# Patient Record
Sex: FEMALE | Race: WHITE | NOT HISPANIC OR LATINO | Employment: FULL TIME | URBAN - METROPOLITAN AREA
[De-identification: names, ages, dates, MRNs, and addresses within clinical notes are randomized per-mention and may not be internally consistent; named-entity substitution may affect disease eponyms.]

---

## 2020-07-22 ENCOUNTER — OFFICE VISIT (OUTPATIENT)
Dept: MIDWIFE SERVICES | Facility: CLINIC | Age: 30
End: 2020-07-22
Payer: COMMERCIAL

## 2020-07-22 VITALS
HEIGHT: 65 IN | BODY MASS INDEX: 30.49 KG/M2 | SYSTOLIC BLOOD PRESSURE: 102 MMHG | DIASTOLIC BLOOD PRESSURE: 72 MMHG | HEART RATE: 72 BPM | WEIGHT: 183 LBS

## 2020-07-22 DIAGNOSIS — Z11.8 SCREENING FOR CHLAMYDIAL DISEASE: Primary | ICD-10-CM

## 2020-07-22 DIAGNOSIS — Z30.09 ENCOUNTER FOR COUNSELING REGARDING CONTRACEPTION: ICD-10-CM

## 2020-07-22 DIAGNOSIS — Z11.3 SCREEN FOR STD (SEXUALLY TRANSMITTED DISEASE): ICD-10-CM

## 2020-07-22 PROCEDURE — 87591 N.GONORRHOEAE DNA AMP PROB: CPT | Performed by: NURSE PRACTITIONER

## 2020-07-22 PROCEDURE — 99203 OFFICE O/P NEW LOW 30 MIN: CPT | Performed by: NURSE PRACTITIONER

## 2020-07-22 PROCEDURE — 87491 CHLMYD TRACH DNA AMP PROBE: CPT | Performed by: NURSE PRACTITIONER

## 2020-07-22 SDOH — HEALTH STABILITY: MENTAL HEALTH: HOW MANY STANDARD DRINKS CONTAINING ALCOHOL DO YOU HAVE ON A TYPICAL DAY?: 3 OR 4

## 2020-07-22 SDOH — HEALTH STABILITY: MENTAL HEALTH: HOW OFTEN DO YOU HAVE 6 OR MORE DRINKS ON ONE OCCASION?: LESS THAN MONTHLY

## 2020-07-22 SDOH — HEALTH STABILITY: MENTAL HEALTH: HOW OFTEN DO YOU HAVE A DRINK CONTAINING ALCOHOL?: 2-4 TIMES A MONTH

## 2020-07-22 ASSESSMENT — ANXIETY QUESTIONNAIRES
6. BECOMING EASILY ANNOYED OR IRRITABLE: NOT AT ALL
IF YOU CHECKED OFF ANY PROBLEMS ON THIS QUESTIONNAIRE, HOW DIFFICULT HAVE THESE PROBLEMS MADE IT FOR YOU TO DO YOUR WORK, TAKE CARE OF THINGS AT HOME, OR GET ALONG WITH OTHER PEOPLE: NOT DIFFICULT AT ALL
2. NOT BEING ABLE TO STOP OR CONTROL WORRYING: NOT AT ALL
7. FEELING AFRAID AS IF SOMETHING AWFUL MIGHT HAPPEN: NOT AT ALL
3. WORRYING TOO MUCH ABOUT DIFFERENT THINGS: NOT AT ALL
5. BEING SO RESTLESS THAT IT IS HARD TO SIT STILL: NOT AT ALL
GAD7 TOTAL SCORE: 0
1. FEELING NERVOUS, ANXIOUS, OR ON EDGE: NOT AT ALL

## 2020-07-22 ASSESSMENT — PATIENT HEALTH QUESTIONNAIRE - PHQ9
SUM OF ALL RESPONSES TO PHQ QUESTIONS 1-9: 0
5. POOR APPETITE OR OVEREATING: NOT AT ALL

## 2020-07-22 ASSESSMENT — MIFFLIN-ST. JEOR: SCORE: 1560.96

## 2020-07-22 NOTE — PROGRESS NOTES
SUBJECTIVE:   Olive Salinas is a 30 year old who presents to the clinic for discussion of birth control methods.   She has used the following methods in the past: CHANDANA and withdrawal with calendar method.  She is in a new monogamous relationship and desires to discuss effective options that she does not need to remember to take every day.   Today she is interested in discussing Mirena IUD, Paragard IUD, Depo Provera, Nexplanon and Nuva Ring  Histories reviewed and updated  Past Medical History:   Diagnosis Date     Depression     history of depression 6 yrs ago     Past Surgical History:   Procedure Laterality Date     C RAD RESEC TONSIL/PILLARS  2015     KIDNEY SURGERY  1994    kidney reflux      Social History     Socioeconomic History     Marital status: Single     Spouse name: Not on file     Number of children: Not on file     Years of education: Not on file     Highest education level: Not on file   Occupational History     Not on file   Social Needs     Financial resource strain: Not on file     Food insecurity     Worry: Not on file     Inability: Not on file     Transportation needs     Medical: Not on file     Non-medical: Not on file   Tobacco Use     Smoking status: Never Smoker     Smokeless tobacco: Never Used   Substance and Sexual Activity     Alcohol use: Yes     Frequency: 2-4 times a month     Drinks per session: 3 or 4     Binge frequency: Less than monthly     Comment: social drinker     Drug use: Never     Sexual activity: Yes     Partners: Male     Birth control/protection: None   Lifestyle     Physical activity     Days per week: Not on file     Minutes per session: Not on file     Stress: Not on file   Relationships     Social connections     Talks on phone: Not on file     Gets together: Not on file     Attends Church service: Not on file     Active member of club or organization: Not on file     Attends meetings of clubs or organizations: Not on file     Relationship status: Not  "on file     Intimate partner violence     Fear of current or ex partner: Not on file     Emotionally abused: Not on file     Physically abused: Not on file     Forced sexual activity: Not on file   Other Topics Concern     Not on file   Social History Narrative     Not on file     Family History   Problem Relation Age of Onset     Migraines Mother      Hypothyroidism Mother      Depression Father      No Known Problems Sister      GI problems Brother      No Known Problems Maternal Grandmother      No Known Problems Maternal Grandfather      No Known Problems Paternal Grandmother      Breast Cancer No family hx of      Ovarian Cancer No family hx of      Colon Cancer No family hx of      Uterine Cancer No family hx of        Menstrual History:  Menses every 28 days.  Length of menses: 4 days  Menstrual description: crampy    Denies the following contraindications to estrogen/progesterone combined contraception:  Migraine with aura  Smoking over age 37  Liver disease  Personal history of blood clot or stroke   History of heart disease  History of breast cancer  Undiagnosed vaginal bleeding  Hypertension  Pregnancy    Denies the following contraindications to the IUD:  Distortion of the uterine cavity  Harshal's disease/copper allergy  Active pelvic infection  Unexplained uterine bleeding  Known or suspected pregnancy  Breast cancer or liver disease    Health maintenance updated:  yes    ROS:   12 point review of systems negative other than symptoms noted below or in the HPI.  Genitourinary: recent intercourse    EXAM:  /72   Pulse 72   Ht 1.651 m (5' 5\")   Wt 83 kg (183 lb)   LMP 07/10/2020 (Exact Date)   Breastfeeding No   BMI 30.45 kg/m    Body mass index is 30.45 kg/m .    ASSESSMENT/PLAN:    ICD-10-CM    1. Screening for chlamydial disease  Z11.8 CHLAMYDIA TRACHOMATIS PCR   2. Screen for STD (sexually transmitted disease)  Z11.3 NEISSERIA GONORRHOEA PCR   3. Encounter for counseling regarding " contraception  Z30.09      There are no contraindications to the use of Mirena IUD, Depo Provera, Nexplanon and Nuva Ring    COUNSELING:  Reviewed risks and benefits of contraceptive use  Olive desires to have Mirena IUD placed.    Last intercourse 7/17-7/18  Discussed proper use of chosen method, timing of placement 2 weeks from intercourse with negative pregnancy test or with next menstrual cycle.  STI screening done today  Handouts/Instrucions provided    Josie SHARPE CNM

## 2020-07-22 NOTE — PATIENT INSTRUCTIONS
Patient Education     Birth Control Methods  Birth control methods are used to help prevent pregnancy. There are many different methods to choose from. Talk to your healthcare provider about which method is right for you. Be sure to ask your provider about the effectiveness of each method. Also ask about the benefits, risks, and side effects of each method.  Hormones  Some birth control methods work by releasing hormones such as progestin and estrogen. These methods include hormone implants, hormone shots, the vaginal ring, the patch, and birth control pills. They all work by stopping ovulation (release of the egg from the ovary). The implant is a small device that needs to be placed in the upper arm by a trained healthcare provider. It works for up to 3 years. Hormone injections must be repeated every 3 months. The vaginal ring must be replaced monthly (it can be removed during the fourth week of each cycle). The patch must be replaced weekly (it is not worn during the fourth week of each cycle). Birth control pills must be taken every day. All of these methods are effective and can be stopped at any time.  Intrauterine device (IUD)  An IUD is a small, T-shaped device. It must be placed in the uterus by a trained healthcare provider. There are different types of IUDs available. They work by causing changes in the uterus that make it harder for sperm to reach the egg. Depending on the type of IUD you have, it may work for several years or longer. The IUD is a reversible birth control method. This means it can be removed at any time.  Condom  A condom is a sheath that forms a thin barrier between the penis and the vagina. It helps prevent pregnancy by keeping sperm from entering the vagina. When latex condoms are used, they have the added benefit of protecting against most STIs (sexually transmitted infections). Condoms are used each time there is sexual intercourse and should be discarded after each use. Ask your  healthcare provider about the different types of condoms available. These include both the male condom and female condom.  Spermicide  Spermicides come as foams, jellies, creams, suppositories, and tablets.  They help prevent pregnancy by killing sperm. When used alone they are not that reliable. They work best when combined with other birth control methods such as diaphragms and cervical caps.  Sponge, diaphragm, and cervical cap  All of these methods help prevent pregnancy by covering the opening of the uterus (cervix). This prevents sperm from passing through.  The sponge contains spermicide. It can be bought over the counter. The sponge must be left in place for at least 6 hours after the last time you have sex. However, it should not stay in place for more than 24 hours. It should be discarded after it is used.  The diaphragm and cervical cap must be fitted and prescribed by your healthcare provider. Both are used with spermicide. The diaphragm must be left in place for at least 6 hours after sex. However, it should not stay in place for more than 24 hours. It can be washed and reused. The cervical cap must be left in place for at least 6 hours after sex. However, it should not stay in place for more than 48 hours. It can be washed and reused.  Withdrawal method  This is when the man pulls his penis out of the vagina just before ejaculation ( coming ). This lowers the amount of sperm entering the vagina. Be aware that fluids released just before ejaculation often still contain some sperm, so this method is not as reliable as certain other methods.  Rhythm method  This method requires that you know when in your menstrual cycle you are likely to become pregnant. Then, you avoid sex during those days. This requires careful planning and good discipline. Your healthcare provider can explain more about how this works.  Tubal ligation and vasectomy  These are surgical methods to prevent pregnancy. Tubal ligation is an  option for women. The fallopian tubes are blocked or cut (ligated). This keeps the egg from passing into the uterus or sperm from reaching the egg. Vasectomy is an option for men. The tubes that normally carry sperm to the penis are either closed or blocked. Both tubal ligation and vasectomy are permanent birth control methods. This means reversal is either not possible or unlikely to work. They are good choices for women and men who know that they do not want to have children in the future.  Date Last Reviewed: 11/1/2017 2000-2019 [employer]. [address] [place] [address]. All rights reserved. This information is not intended as a substitute for professional medical care. Always follow your healthcare professional's instructions.           Patient Education     Birth Control Methods  Birth control methods are used to help prevent pregnancy. There are many different methods to choose from. Talk to your healthcare provider about which method is right for you. Be sure to ask your provider about the effectiveness of each method. Also ask about the benefits, risks, and side effects of each method.  Hormones  Some birth control methods work by releasing hormones such as progestin and estrogen. These methods include hormone implants, hormone shots, the vaginal ring, the patch, and birth control pills. They all work by stopping ovulation (release of the egg from the ovary). The implant is a small device that needs to be placed in the upper arm by a trained healthcare provider. It works for up to 3 years. Hormone injections must be repeated every 3 months. The vaginal ring must be replaced monthly (it can be removed during the fourth week of each cycle). The patch must be replaced weekly (it is not worn during the fourth week of each cycle). Birth control pills must be taken every day. All of these methods are effective and can be stopped at any time.  Intrauterine device (IUD)  An IUD is a  small, T-shaped device. It must be placed in the uterus by a trained healthcare provider. There are different types of IUDs available. They work by causing changes in the uterus that make it harder for sperm to reach the egg. Depending on the type of IUD you have, it may work for several years or longer. The IUD is a reversible birth control method. This means it can be removed at any time.  Condom  A condom is a sheath that forms a thin barrier between the penis and the vagina. It helps prevent pregnancy by keeping sperm from entering the vagina. When latex condoms are used, they have the added benefit of protecting against most STIs (sexually transmitted infections). Condoms are used each time there is sexual intercourse and should be discarded after each use. Ask your healthcare provider about the different types of condoms available. These include both the male condom and female condom.  Spermicide  Spermicides come as foams, jellies, creams, suppositories, and tablets.  They help prevent pregnancy by killing sperm. When used alone they are not that reliable. They work best when combined with other birth control methods such as diaphragms and cervical caps.  Sponge, diaphragm, and cervical cap  All of these methods help prevent pregnancy by covering the opening of the uterus (cervix). This prevents sperm from passing through.  The sponge contains spermicide. It can be bought over the counter. The sponge must be left in place for at least 6 hours after the last time you have sex. However, it should not stay in place for more than 24 hours. It should be discarded after it is used.  The diaphragm and cervical cap must be fitted and prescribed by your healthcare provider. Both are used with spermicide. The diaphragm must be left in place for at least 6 hours after sex. However, it should not stay in place for more than 24 hours. It can be washed and reused. The cervical cap must be left in place for at least 6 hours  after sex. However, it should not stay in place for more than 48 hours. It can be washed and reused.  Withdrawal method  This is when the man pulls his penis out of the vagina just before ejaculation ( coming ). This lowers the amount of sperm entering the vagina. Be aware that fluids released just before ejaculation often still contain some sperm, so this method is not as reliable as certain other methods.  Rhythm method  This method requires that you know when in your menstrual cycle you are likely to become pregnant. Then, you avoid sex during those days. This requires careful planning and good discipline. Your healthcare provider can explain more about how this works.  Tubal ligation and vasectomy  These are surgical methods to prevent pregnancy. Tubal ligation is an option for women. The fallopian tubes are blocked or cut (ligated). This keeps the egg from passing into the uterus or sperm from reaching the egg. Vasectomy is an option for men. The tubes that normally carry sperm to the penis are either closed or blocked. Both tubal ligation and vasectomy are permanent birth control methods. This means reversal is either not possible or unlikely to work. They are good choices for women and men who know that they do not want to have children in the future.  Date Last Reviewed: 11/1/2017 2000-2019 [employer]. [address] [place] [address], [place] [id number]. All rights reserved. This information is not intended as a substitute for professional medical care. Always follow your healthcare professional's instructions.           Patient Education     Birth Control: IUD (Intrauterine Device)    The IUD (intrauterine device) is small, flexible, and T-shaped. A trained healthcare provider places it in the uterus. The IUD is one of the most effective birth control methods. It is also reversible. This means it can be removed at any time by a trained healthcare provider. New IUDs are safe and do not have the risks  of older types of IUDs.  Pregnancy rates  Talk to your healthcare provider about the effectiveness of this birth control method.  Types of IUDs  IUD insertion is done in the healthcare provider s office. Two types of IUDs are available:    The copper IUD releases a small amount of copper into the uterus. The copper makes it harder for sperm to reach the egg. The device works for at least 10 years.    The progestin IUD releases a hormone called progestin. It causes changes in the uterus to help prevent pregnancy. The device works for 3 to 5 years, depending on which device is chosen. It may be recommended for women who have anemia or heavy and painful periods.  IUDs have thin strings that hang from the opening of the uterus into the vagina. This lets you check that the IUD stays in place.  Things to know about IUDs    IUDs can be used by women who have never been pregnant or by women with a history of sexually transmitted infections (STIs) or tubal pregnancy.    It won't move from the uterus to any other part of the body.    There is a slight risk of the device coming out of the vagina (expulsion).    It may not work in women who have an abnormally shaped uterus.    A copper IUD may cause heavier periods and cramping.    Progestin IUD may cause light periods or no periods at all (irregular bleeding or spotting is possible and normal during first 3 to 6 months).    If you get a sexually transmitted infection with an IUD in place, symptoms may be more severe.  When to call your healthcare provider  Be sure your healthcare provider knows if you have:    A sexually transmitted infection (STI) or possible STI    Liver problems    Blood clots (for progestin IUD only)    Breast cancer or a history of breast cancer (progestin IUD only)   Date Last Reviewed: 3/1/2017    [id number] [employer]. [address] [place] [address], [place] [address]. All rights reserved. This information is not intended as a substitute for  professional medical care. Always follow your healthcare professional's instructions.         Sexually Transmitted Infections (STIs)    Many STIs do not have any symptoms and can be transmitted through any type of sex, not just intercourse, but also anal, oral, and other types of sex play. Some STIs are transmitted by bacteria and others by viruses. It is important to keep yourself safe and infection free as many STIs have long term side effects.     Common STIs    Chlamydia  Gonorrhea   Genital Herpes  Genital warts/HPV (some strains of HPV cause cervical cancer)  Hepatitis A, B, & C  Syphilis   Trichomoniasis     Who should be screened?      Screening is available to anyone who wishes to be test, some tests are from a blood draw and some are a vaginal swab    Screening should be done even if people have no symptoms or feel fine    Women who have had unprotected sex with a new partner    Women who have had sex with more than one partner should be screened yearly for gonorrhea and chlamydia     The CDC also recommends women aged 25 and younger should be screened yearly for gonorrhea and chlamydia    Everyone should be screened at least once for HIV    Pregnant women are screened for STIs at their first OB visit    We can do all the screenings you need right here in clinic    If any screenings come back positive we will get you treated with the appropriate medications. Your partner (s) will also need to be screened and treated by their providers.    How to protect yourself      The most effective way to protect yourself from getting an STI is by using a condom every time that you have sex. (Remember male condoms made out of natural materials do not protect against STIs)    Ask us about vaccines, if you are under the age of 26 we can start a vaccine series for HPV prevention.    If you or your partner have herpes make sure to use a condom or abstain from sexual intercourse/genital contact when you have active lesions.  Also avoid oral sex when you or your partner have cold sores    There is no surefire way to prevent all STIs from being transmitted but proper screening and the methods above can help to reduce your risk!    Please ask your midwife at [facility]  if you have any questions

## 2020-07-23 ASSESSMENT — ANXIETY QUESTIONNAIRES: GAD7 TOTAL SCORE: 0

## 2020-07-24 LAB
C TRACH DNA SPEC QL NAA+PROBE: NEGATIVE
N GONORRHOEA DNA SPEC QL NAA+PROBE: NEGATIVE
SPECIMEN SOURCE: NORMAL
SPECIMEN SOURCE: NORMAL

## 2020-08-07 ENCOUNTER — OFFICE VISIT (OUTPATIENT)
Dept: MIDWIFE SERVICES | Facility: CLINIC | Age: 30
End: 2020-08-07
Payer: COMMERCIAL

## 2020-08-07 VITALS
WEIGHT: 183 LBS | HEART RATE: 72 BPM | DIASTOLIC BLOOD PRESSURE: 72 MMHG | HEIGHT: 65 IN | SYSTOLIC BLOOD PRESSURE: 116 MMHG | BODY MASS INDEX: 30.49 KG/M2

## 2020-08-07 DIAGNOSIS — Z01.812 PRE-PROCEDURE LAB EXAM: ICD-10-CM

## 2020-08-07 DIAGNOSIS — Z30.430 ENCOUNTER FOR INSERTION OF INTRAUTERINE CONTRACEPTIVE DEVICE: Primary | ICD-10-CM

## 2020-08-07 DIAGNOSIS — Z97.5 IUD (INTRAUTERINE DEVICE) IN PLACE: ICD-10-CM

## 2020-08-07 DIAGNOSIS — Z12.4 SCREENING FOR CERVICAL CANCER: ICD-10-CM

## 2020-08-07 LAB — HCG UR QL: NEGATIVE

## 2020-08-07 PROCEDURE — 58300 INSERT INTRAUTERINE DEVICE: CPT | Performed by: ADVANCED PRACTICE MIDWIFE

## 2020-08-07 PROCEDURE — 81025 URINE PREGNANCY TEST: CPT | Performed by: ADVANCED PRACTICE MIDWIFE

## 2020-08-07 PROCEDURE — G0124 SCREEN C/V THIN LAYER BY MD: HCPCS | Performed by: ADVANCED PRACTICE MIDWIFE

## 2020-08-07 PROCEDURE — G0145 SCR C/V CYTO,THINLAYER,RESCR: HCPCS | Performed by: ADVANCED PRACTICE MIDWIFE

## 2020-08-07 ASSESSMENT — MIFFLIN-ST. JEOR: SCORE: 1560.96

## 2020-08-07 NOTE — PATIENT INSTRUCTIONS
Your Intrauterine Device (IUD)     What to watch for right after IUD placement:      Some women may experience uterine cramps, bleeding, and/or dizziness during and right after IUD placement.       To help minimize the cramps, you may take ibuprofen 600 mg with food. These symptoms should improve over the next 24 hours.  Mild cramping may be present for a few days after IUD placement. You may continue taking ibuprofen as directed if needed.      You may experience spotting or bleeding for the first few weeks to months after IUD placement.        Other side effects can include:  Anemia, hemorrhage, partial or complete expulsion of device, uterine or cervical perforation, embedding of IUD in the uterine wall, increased risk of pelvic inflammatory disease.  Women who become pregnant with an IUD in place are at higher risk of an ectopic pregnancy.  There is also a higher risk of miscarriage when pregnancy occurs with an IUD in place.      Use condoms or abstain from sex for 7 days after the insertion of your Mirena or Kyleena or Ernestina  IUD.  This is not necessary if you had a ParaGard IUD placed.      If you experience fever, chills, abdominal pain, worsening pelvic pain, dizziness, unusually heavy vaginal bleeding, suspected expulsion of device or foul smelling vaginal discharge please call the clinic for evaluation.      Please schedule an appointment at the clinic for a string check 4-6 weeks after IUD placement    Your periods may change (Ernestina/Kyleena/Mirena):      For the first 3 to 6 months, your monthly period may become irregular. You may also have frequent spotting or light bleeding. A few women have heavy bleeding during this time. After your body adjusts, the number of bleeding days is likely to decrease (but may remain irregular), and you may even find that your periods stop altogether for as long as Ernestina/Mirena is in place.  Your periods will return rapidly once the IUD is removed.      ParaGard IUD  users:      ParaGard IUD users may experience heavier than normal cycles while their IUD is in place, this is considered normal     Back-up contraception is not needed      Checking for your strings:      We encourage everyone with an IUD in place to check for their strings monthly      You may check your own IUD strings by inserting a finger into the vagina and feeling the strings as they exit the cervix.  The strings will initially feel firm, like fishing line, but will soften over a few weeks.  After the strings have softened, you or your partner should not be able to feel the strings during intercourse.       If the string length greatly changes or if you cannot feel your strings at all please make an appointment to see you midwife and use a backup method of contraception like a condom.      If you can feel something hard/plastic like the IUD may not be in the correct place. You should then see your healthcare provider to have the position confirmed with ultrasound.       Remember:    IUD's do not protect against HIV or STIs.  IUD's do not prevent the formation of ovarian cysts.  IUD's do not typically reduce acne or cause weight gain or mood changes.    For more information:  [email or web address]-us.[email or web address]/    The ParaGard IUD is effective for 10 years.  The Ernestina IUD is effective for 3 years.  The Kyleena/Mirena IUD is effective for 5 years.  Your IUD can easily be removed by a healthcare professional at any time.    Do not try to remove the IUD yourself.      If you have questions or concerns please call:    [employer] [facility]   [phone]

## 2020-08-07 NOTE — PROGRESS NOTES
INDICATIONS:                                                      Is a pregnancy test required: Yes.  Was it positive or negative?  Negative  Was a consent obtained?  Yes    Olive Salinas is a 30 year old female,   who presents for insertion of an IUD. The risks, benefits and alternatives of IUD insertion were discussed in detail previously. She also has reviewed the product brochure.  She has elected to go ahead with the insertion  today and her questions were answered. Consent was signed. Her LMP began on 2020 and was normal in duration and amount of flow. A pregnancy test was performed today:  Yes    Due for cycle today  Last intercourse 2-3 weeks ago  Negative UPT  Previously counseled on , again reviewed risks of procedure including infection and perforation as well as mechanism of action    PROCEDURE:                                                      The pelvic exam revealed normal external genitalia. On bimanual exam the uterus was Anteverted and Midposition and normal in size with no tenderness present. A speculum was inserted into the vagina and the cervix was visualized. The cervix was prepped with Betadine . The anterior lip of the cervix was grasped with a single toothed tenaculum. The uterus sounded to 9 cm. A Mirena IUD was then inserted without difficulty. The string was cut to 3 cm.  The patient experienced a moderate amount of cramping.    PELVIC EXAM:  Vulva: No external lesions, BUS WNL  Vagina: Moist, pink, discharge normal  well rugated, no lesions  Cervix:mid position, smooth, pink, no visible lesions, neg CMT, pap obtained  Uterus: Normal size, anteverted, non-tender, mobile  Ovaries: No mass, non-tender  Rectal exam: deferred      POST PROCEDURE:                                                      She  tolerated the procedure well. Had some nausea and vomiting following procedure but after drinking water and resting was discharged in stable condition within 15  minutes.   Return to clinic in 6-12 weeks for IUD check.  Call if severe cramping, fever, abnormal bleeding, abnormal discharge or pelvic pain develop.  Patient was counseled about the chance of irregular bleeding for 3-6 months  We reviewed to use back up method for 7 days and nothing per vagina for 3-5 days  Handout given to patient regarding what IUD users may expect    ANNEMARIE ClaireM

## 2020-08-07 NOTE — RESULT ENCOUNTER NOTE
Results discussed directly with patient while patient was present. Any further details documented in the note.   ANNEMARIE Claire CNM

## 2020-08-13 ENCOUNTER — PATIENT OUTREACH (OUTPATIENT)
Dept: MIDWIFE SERVICES | Facility: CLINIC | Age: 30
End: 2020-08-13

## 2020-08-13 DIAGNOSIS — R87.612 PAPANICOLAOU SMEAR OF CERVIX WITH LOW GRADE SQUAMOUS INTRAEPITHELIAL LESION (LGSIL): ICD-10-CM

## 2020-08-13 LAB
COPATH REPORT: ABNORMAL
PAP: ABNORMAL

## 2020-09-21 NOTE — PROGRESS NOTES
INDICATIONS:                                                    Is a pregnancy test required: Yes.  Was it positive or negative?  Negative  Was a consent obtained?  Yes    Olive Salinas, is a 30 year old female, who had a recent LGSIL pap.  HPV Not done no prior history of abnormal pap. Here today for colposcopy. Discussed indication, risks of infection and bleeding.  This was her first abnormal pap  Has not had hpv testing since less than 33yo  Much anxiety about procedures, given alcohol    Her last pap was   Lab Results   Component Value Date    PAP LSIL 08/07/2020    .    PROCEDURE:                                                      Cervix is stained with acetic acid and viewed colposcopically. Squamocolumnar junction is visualized in it's entirety. acetowhite lesion(s) noted at 9 o'clock . Biopsy done Yes. Endocervical curretage Done    Very tiny round non staining area seen at 9:00   Rest of cervix and vaginal walls looked normal  Used lugols also and monsells    Much anxiety during procedure     POST PROCEDURE:                                                      IMPRESSION: colposcopy adequate    PLAN : Await the results of the biopsies.  She experienced moderate discomfort during the procedure. There were no complications. Patient was discharged in stable condition.    Patient advised to call the clinic if excessive bleeding, pelvic pain, or fever.     Follow-up based on pathology results.  Long discussed of hpv, testing method, transmission  Discussed hpv vaccine    IUD string was seen    Anuradha Samson MD

## 2020-09-22 ENCOUNTER — OFFICE VISIT (OUTPATIENT)
Dept: OBGYN | Facility: CLINIC | Age: 30
End: 2020-09-22
Payer: COMMERCIAL

## 2020-09-22 VITALS
DIASTOLIC BLOOD PRESSURE: 68 MMHG | HEIGHT: 65 IN | WEIGHT: 181 LBS | BODY MASS INDEX: 30.16 KG/M2 | SYSTOLIC BLOOD PRESSURE: 114 MMHG

## 2020-09-22 DIAGNOSIS — R87.612 LGSIL ON PAP SMEAR OF CERVIX: ICD-10-CM

## 2020-09-22 DIAGNOSIS — Z01.812 PRE-PROCEDURE LAB EXAM: Primary | ICD-10-CM

## 2020-09-22 LAB — HCG UR QL: NEGATIVE

## 2020-09-22 PROCEDURE — 88342 IMHCHEM/IMCYTCHM 1ST ANTB: CPT | Performed by: OBSTETRICS & GYNECOLOGY

## 2020-09-22 PROCEDURE — 88305 TISSUE EXAM BY PATHOLOGIST: CPT | Performed by: OBSTETRICS & GYNECOLOGY

## 2020-09-22 PROCEDURE — 57454 BX/CURETT OF CERVIX W/SCOPE: CPT | Performed by: OBSTETRICS & GYNECOLOGY

## 2020-09-22 PROCEDURE — 81025 URINE PREGNANCY TEST: CPT | Performed by: OBSTETRICS & GYNECOLOGY

## 2020-09-22 ASSESSMENT — MIFFLIN-ST. JEOR: SCORE: 1551.89

## 2020-09-25 LAB — COPATH REPORT: NORMAL

## 2020-09-28 ENCOUNTER — TELEPHONE (OUTPATIENT)
Dept: OBGYN | Facility: CLINIC | Age: 30
End: 2020-09-28

## 2021-01-06 ENCOUNTER — PATIENT OUTREACH (OUTPATIENT)
Dept: OBGYN | Facility: CLINIC | Age: 31
End: 2021-01-06

## 2021-01-06 PROBLEM — R87.612 PAPANICOLAOU SMEAR OF CERVIX WITH LOW GRADE SQUAMOUS INTRAEPITHELIAL LESION (LGSIL): Status: ACTIVE | Noted: 2020-08-07

## 2021-01-15 ENCOUNTER — HEALTH MAINTENANCE LETTER (OUTPATIENT)
Age: 31
End: 2021-01-15

## 2021-08-09 NOTE — PROGRESS NOTES
Olive is a 31 year old  female who presents for annual exam.     Besides routine health maintenance, she has no other health concerns today .  HPI:  Mirena IUD in for 1 year. She used to have heavy menses. Menses are not much lighter, but lasting longer 5-6 days. Additionally her mother has thyroid disease. It was diagnosed later in life, but she thinks she had it for a long time, but was just never tested. Olive does have heat/cold intolerance, but thinks it is probably normal.   Due to her Pap history it is recommended that she have cotesting done today (see 21) note.   Menses are spotting every other week, much lighter lasting 5-6. days.   Menses flow: normal.    Patient's last menstrual period was 2021 (exact date)..   Using IUD for contraception.    She is not currently considering pregnancy.    REPRODUCTIVE/GYNECOLOGIC HISTORY:  Olive is sexually active with male partner(s) and is currently in monogamous relationship.   STI testing offered?  Accepted  History of abnormal Pap smear:  Yes  SOCIAL HISTORY  Abuse: does not report having previously been physical or sexually abused.    Do you feel safe in your environment? YES     She  reports that she has never smoked. She has never used smokeless tobacco.      Last PHQ-9 score on record =   PHQ-9 SCORE 2021   PHQ-9 Total Score 0     Last GAD7 score on record =   VELVET-7 SCORE 2021   Total Score 0     Alcohol Score = 1    HEALTH MAINTENANCE:  Cholesterol: None found History of abnormal lipids: No  Mammo: Never . History of abnormal Mammo: Not applicable.  Regular Self Breast Exams: No  TSH:   TSH   Date Value Ref Range Status   10/23/2013 1.23 0.4 - 5.0 mU/L Final      Pap  Lab Results   Component Value Date    PAP LSIL 2020      Immunizations up to date: yes per pt  (Gardasil, Tdap, Flu)  Health maintenance updated:  No, due for pap    HEALTHY HABITS  Eating habits: eats regular meals and follows a balanced nutrition  diet  Calcium/Vitamin D intake: source:  dairy        HISTORY:  OB History    Para Term  AB Living   0 0 0 0 0 0   SAB TAB Ectopic Multiple Live Births   0 0 0 0 0     Past Medical History:   Diagnosis Date     Depression     history of depression 6 yrs ago     Papanicolaou smear of cervix with low grade squamous intraepithelial lesion (LGSIL) 2020     Past Surgical History:   Procedure Laterality Date     C RAD RESEC TONSIL/PILLARS       KIDNEY SURGERY  1994    kidney reflux      Family History   Problem Relation Age of Onset     Migraines Mother      Hypothyroidism Mother      Depression Father      No Known Problems Sister      GI problems Brother      No Known Problems Maternal Grandmother      No Known Problems Maternal Grandfather      No Known Problems Paternal Grandmother      No Known Problems Paternal Grandfather      No Known Problems Other      Breast Cancer No family hx of      Ovarian Cancer No family hx of      Colon Cancer No family hx of      Uterine Cancer No family hx of      Social History     Socioeconomic History     Marital status: Single     Spouse name: Not on file     Number of children: Not on file     Years of education: Not on file     Highest education level: Not on file   Occupational History     Not on file   Tobacco Use     Smoking status: Never Smoker     Smokeless tobacco: Never Used   Substance and Sexual Activity     Alcohol use: Yes     Comment: social drinker     Drug use: Never     Sexual activity: Yes     Partners: Male     Birth control/protection: None   Other Topics Concern     Not on file   Social History Narrative     Not on file     Social Determinants of Health     Financial Resource Strain:      Difficulty of Paying Living Expenses:    Food Insecurity:      Worried About Running Out of Food in the Last Year:      Ran Out of Food in the Last Year:    Transportation Needs:      Lack of Transportation (Medical):      Lack of Transportation  "(Non-Medical):    Physical Activity:      Days of Exercise per Week:      Minutes of Exercise per Session:    Stress:      Feeling of Stress :    Social Connections:      Frequency of Communication with Friends and Family:      Frequency of Social Gatherings with Friends and Family:      Attends Alevism Services:      Active Member of Clubs or Organizations:      Attends Club or Organization Meetings:      Marital Status:    Intimate Partner Violence:      Fear of Current or Ex-Partner:      Emotionally Abused:      Physically Abused:      Sexually Abused:        Current Outpatient Medications:      levonorgestrel (MIRENA) 20 MCG/24HR IUD, 1 each (20 mcg) by Intrauterine route once, Disp: , Rfl:    No Known Allergies    Past medical, surgical, social and family history were reviewed and updated in EPIC.    ROS:   12 point review of systems negative other than symptoms noted below or in the HPI.    PHYSICAL EXAM:  /60   Pulse 68   Ht 1.651 m (5' 5\")   Wt 80.6 kg (177 lb 9.6 oz)   LMP 08/08/2021 (Exact Date)   BMI 29.55 kg/m     BMI: Body mass index is 29.55 kg/m .  Constitutional: healthy, alert and no distress  Neck: symmetrical, thyroid normal size, no masses present, no lymphadenopathy present.   Cardiovascular: RRR, no murmurs present  Respiratory: breathing unlabored, lungs CTA bilaterally  Breast:normal without masses, tenderness or nipple discharge and no palpable axillary masses or adenopathy  Gastrointestinal: abdomen soft, non-tender, bowel sounds present  PELVIC EXAM:  Vulva: No lesions, no adenopathy, BUS WNL  Vagina: Moist, pink, discharge normal  well rugated, no lesions  Cervix:smooth, pink, no visible lesions  Uterus: Normal size, non-tender, mobile  Ovaries: No masses palpated  Rectal exam: deferred    ASSESSMENT/PLAN:    ICD-10-CM    1. Encounter for gynecological examination without abnormal finding  Z01.419    2. Papanicolaou smear of cervix with low grade squamous intraepithelial " lesion (LGSIL)  R87.612 Pap thin layer screen reflex to HPV if ASCUS - recommended age 25 - 29 years   3. Screen for STD (sexually transmitted disease)  Z11.3 Neisseria gonorrhoeae PCR     HIV Antigen Antibody Combo     Treponema Abs w Reflex to RPR and Titer     Hepatitis B Surface Antibody     Hepatitis C antibody     Hepatitis C antibody     Hepatitis B Surface Antibody     Treponema Abs w Reflex to RPR and Titer     HIV Antigen Antibody Combo   4. Screening for chlamydial disease  Z11.8 Chlamydia trachomatis PCR   5. Family history of thyroid disease  Z83.49 TSH with free T4 reflex     TSH with free T4 reflex   6. Temperature intolerance  R68.89 TSH with free T4 reflex     TSH with free T4 reflex     No results found for any visits on 08/18/21.      COUNSELING:   Reviewed preventive health counseling, as reflected in patient instructions   reports that she has never smoked. She has never used smokeless tobacco.    1) Discussed AUB: Options including keeping Mirena in if side effects are tolerated, replacing Mirena to see if a new one would work better, changing birth control. At this time she wants to keep her Mirena, but will notify us if she changes her mind.     2) TSH ordered today.     3) Pap with cotesting ordered along with STI panel.     Follow up in 1 year, or sooner if needed.      15 minutes spent on the date of the encounter doing chart review, history and exam, documentation and further activities per the note      Ruby Wilder, DNP, APRN, CNM

## 2021-08-18 ENCOUNTER — OFFICE VISIT (OUTPATIENT)
Dept: MIDWIFE SERVICES | Facility: CLINIC | Age: 31
End: 2021-08-18
Payer: COMMERCIAL

## 2021-08-18 VITALS
SYSTOLIC BLOOD PRESSURE: 104 MMHG | WEIGHT: 177.6 LBS | BODY MASS INDEX: 29.59 KG/M2 | HEIGHT: 65 IN | DIASTOLIC BLOOD PRESSURE: 60 MMHG | HEART RATE: 68 BPM

## 2021-08-18 DIAGNOSIS — Z01.419 ENCOUNTER FOR GYNECOLOGICAL EXAMINATION WITHOUT ABNORMAL FINDING: Primary | ICD-10-CM

## 2021-08-18 DIAGNOSIS — Z83.49 FAMILY HISTORY OF THYROID DISEASE: ICD-10-CM

## 2021-08-18 DIAGNOSIS — R68.89 TEMPERATURE INTOLERANCE: ICD-10-CM

## 2021-08-18 DIAGNOSIS — Z11.3 SCREEN FOR STD (SEXUALLY TRANSMITTED DISEASE): ICD-10-CM

## 2021-08-18 DIAGNOSIS — Z11.8 SCREENING FOR CHLAMYDIAL DISEASE: ICD-10-CM

## 2021-08-18 DIAGNOSIS — R87.612 PAPANICOLAOU SMEAR OF CERVIX WITH LOW GRADE SQUAMOUS INTRAEPITHELIAL LESION (LGSIL): ICD-10-CM

## 2021-08-18 PROCEDURE — 36415 COLL VENOUS BLD VENIPUNCTURE: CPT | Performed by: ADVANCED PRACTICE MIDWIFE

## 2021-08-18 PROCEDURE — 87624 HPV HI-RISK TYP POOLED RSLT: CPT | Performed by: ADVANCED PRACTICE MIDWIFE

## 2021-08-18 PROCEDURE — G0145 SCR C/V CYTO,THINLAYER,RESCR: HCPCS | Performed by: ADVANCED PRACTICE MIDWIFE

## 2021-08-18 PROCEDURE — 87591 N.GONORRHOEAE DNA AMP PROB: CPT | Performed by: ADVANCED PRACTICE MIDWIFE

## 2021-08-18 PROCEDURE — 87491 CHLMYD TRACH DNA AMP PROBE: CPT | Performed by: ADVANCED PRACTICE MIDWIFE

## 2021-08-18 PROCEDURE — 84443 ASSAY THYROID STIM HORMONE: CPT | Performed by: ADVANCED PRACTICE MIDWIFE

## 2021-08-18 PROCEDURE — G0124 SCREEN C/V THIN LAYER BY MD: HCPCS | Performed by: PATHOLOGY

## 2021-08-18 PROCEDURE — 99395 PREV VISIT EST AGE 18-39: CPT | Performed by: ADVANCED PRACTICE MIDWIFE

## 2021-08-18 PROCEDURE — 87389 HIV-1 AG W/HIV-1&-2 AB AG IA: CPT | Performed by: ADVANCED PRACTICE MIDWIFE

## 2021-08-18 PROCEDURE — 86803 HEPATITIS C AB TEST: CPT | Performed by: ADVANCED PRACTICE MIDWIFE

## 2021-08-18 PROCEDURE — 86780 TREPONEMA PALLIDUM: CPT | Performed by: ADVANCED PRACTICE MIDWIFE

## 2021-08-18 PROCEDURE — 86706 HEP B SURFACE ANTIBODY: CPT | Performed by: ADVANCED PRACTICE MIDWIFE

## 2021-08-18 ASSESSMENT — ANXIETY QUESTIONNAIRES
1. FEELING NERVOUS, ANXIOUS, OR ON EDGE: NOT AT ALL
6. BECOMING EASILY ANNOYED OR IRRITABLE: NOT AT ALL
GAD7 TOTAL SCORE: 0
2. NOT BEING ABLE TO STOP OR CONTROL WORRYING: NOT AT ALL
7. FEELING AFRAID AS IF SOMETHING AWFUL MIGHT HAPPEN: NOT AT ALL
IF YOU CHECKED OFF ANY PROBLEMS ON THIS QUESTIONNAIRE, HOW DIFFICULT HAVE THESE PROBLEMS MADE IT FOR YOU TO DO YOUR WORK, TAKE CARE OF THINGS AT HOME, OR GET ALONG WITH OTHER PEOPLE: NOT DIFFICULT AT ALL
3. WORRYING TOO MUCH ABOUT DIFFERENT THINGS: NOT AT ALL
5. BEING SO RESTLESS THAT IT IS HARD TO SIT STILL: NOT AT ALL

## 2021-08-18 ASSESSMENT — MIFFLIN-ST. JEOR: SCORE: 1531.47

## 2021-08-18 ASSESSMENT — PATIENT HEALTH QUESTIONNAIRE - PHQ9
SUM OF ALL RESPONSES TO PHQ QUESTIONS 1-9: 0
5. POOR APPETITE OR OVEREATING: NOT AT ALL

## 2021-08-19 LAB
C TRACH DNA SPEC QL NAA+PROBE: NEGATIVE
HBV SURFACE AB SERPL IA-ACNC: >1000 M[IU]/ML
HCV AB SERPL QL IA: NONREACTIVE
HIV 1+2 AB+HIV1 P24 AG SERPL QL IA: NONREACTIVE
N GONORRHOEA DNA SPEC QL NAA+PROBE: NEGATIVE
T PALLIDUM AB SER QL: NONREACTIVE
TSH SERPL DL<=0.005 MIU/L-ACNC: 1.27 MU/L (ref 0.4–4)

## 2021-08-19 ASSESSMENT — ANXIETY QUESTIONNAIRES: GAD7 TOTAL SCORE: 0

## 2021-08-24 LAB
BKR LAB AP GYN ADEQUACY: ABNORMAL
BKR LAB AP GYN INTERPRETATION: ABNORMAL
BKR LAB AP HPV REFLEX: ABNORMAL
BKR LAB AP LMP: ABNORMAL
BKR LAB AP PREVIOUS ABNL DX: ABNORMAL
BKR LAB AP PREVIOUS ABNORMAL: ABNORMAL
PATH REPORT.COMMENTS IMP SPEC: ABNORMAL
PATH REPORT.RELEVANT HX SPEC: ABNORMAL

## 2021-08-25 LAB
HUMAN PAPILLOMA VIRUS 16 DNA: NEGATIVE
HUMAN PAPILLOMA VIRUS 18 DNA: NEGATIVE
HUMAN PAPILLOMA VIRUS FINAL DIAGNOSIS: ABNORMAL
HUMAN PAPILLOMA VIRUS OTHER HR: POSITIVE

## 2021-08-26 ENCOUNTER — PATIENT OUTREACH (OUTPATIENT)
Dept: OBGYN | Facility: CLINIC | Age: 31
End: 2021-08-26

## 2021-10-24 ENCOUNTER — HEALTH MAINTENANCE LETTER (OUTPATIENT)
Age: 31
End: 2021-10-24

## 2021-11-29 ENCOUNTER — MYC MEDICAL ADVICE (OUTPATIENT)
Dept: MIDWIFE SERVICES | Facility: CLINIC | Age: 31
End: 2021-11-29
Payer: COMMERCIAL

## 2021-11-29 NOTE — TELEPHONE ENCOUNTER
8/7/2020 Mirena    Routing pt mychart message to provider to advise.    Manuela Jenkins RN on 11/29/2021 at 9:42 AM

## 2022-01-05 ENCOUNTER — OFFICE VISIT (OUTPATIENT)
Dept: MIDWIFE SERVICES | Facility: CLINIC | Age: 32
End: 2022-01-05
Payer: COMMERCIAL

## 2022-01-05 VITALS
HEART RATE: 62 BPM | WEIGHT: 170 LBS | HEIGHT: 65 IN | BODY MASS INDEX: 28.32 KG/M2 | DIASTOLIC BLOOD PRESSURE: 64 MMHG | SYSTOLIC BLOOD PRESSURE: 108 MMHG

## 2022-01-05 DIAGNOSIS — Z30.018 ENCOUNTER FOR INITIAL PRESCRIPTION OF OTHER CONTRACEPTIVES: ICD-10-CM

## 2022-01-05 DIAGNOSIS — Z30.432 ENCOUNTER FOR REMOVAL OF INTRAUTERINE CONTRACEPTIVE DEVICE: Primary | ICD-10-CM

## 2022-01-05 DIAGNOSIS — Z30.012 COUNSELING FOR BIRTH CONTROL, EMERGENCY CONTRACEPTIVE PRESCRIPTION: ICD-10-CM

## 2022-01-05 PROCEDURE — 99213 OFFICE O/P EST LOW 20 MIN: CPT | Mod: 25 | Performed by: ADVANCED PRACTICE MIDWIFE

## 2022-01-05 PROCEDURE — 58301 REMOVE INTRAUTERINE DEVICE: CPT | Performed by: ADVANCED PRACTICE MIDWIFE

## 2022-01-05 ASSESSMENT — MIFFLIN-ST. JEOR: SCORE: 1496.99

## 2022-01-05 NOTE — PROGRESS NOTES
INDICATIONS:                                                      Is a pregnancy test required: No.  Was a consent obtained?  Yes    Olive Salinas is a 31 year old female,, No LMP recorded. (Menstrual status: IUD). who presents today for IUD removal. Her current IUD was placed 2020 ago. She mood change  with the IUD. She requests removal of the IUD because of problems stated above    Today's PHQ-2 Score:   PHQ-2 (  [employer]) 8/15/2021   Q1: Little interest or pleasure in doing things 0   Q2: Feeling down, depressed or hopeless 0   PHQ-2 Score 0   PHQ-2 Total Score (12-17 Years)- Positive if 3 or more points; Administer PHQ-A if positive 0   Q1: Little interest or pleasure in doing things Not at all   Q2: Feeling down, depressed or hopeless Not at all   PHQ-2 Score 0       PROCEDURE:                                                      A speculum exam was performed and the cervix was visualized. The IUD string was visualized. Using ring forceps, the string  was grasped and the IUD removed intact.    POST PROCEDURE:                                                      The patient tolerated the procedure well. Patient was discharged in stable condition.    Call if bleeding, pain or fever occur. and Birth control counseling given.    ANNEMARIE Tejeda CNM      SUBJECTIVE:   Olive Salinas is a 31 year old who presents to the clinic for discussion of birth control methods.   She has used the following methods in the past: Mirena IUD  Today she is interested in discussing: non-hormonal methods  Histories reviewed and updated  Past Medical History:   Diagnosis Date     Depression     history of depression 6 yrs ago     Papanicolaou smear of cervix with low grade squamous intraepithelial lesion (LGSIL) 2020     Past Surgical History:   Procedure Laterality Date     KIDNEY SURGERY      kidney reflux      ZZC RAD RESEC TONSIL/PILLARS  2015     Social History     Socioeconomic History  "    Marital status: Single     Spouse name: Not on file     Number of children: Not on file     Years of education: Not on file     Highest education level: Not on file   Occupational History     Not on file   Tobacco Use     Smoking status: Never Smoker     Smokeless tobacco: Never Used   Substance and Sexual Activity     Alcohol use: Yes     Comment: social drinker, very rare     Drug use: Never     Sexual activity: Yes     Partners: Male     Birth control/protection: I.U.D.   Other Topics Concern     Parent/sibling w/ CABG, MI or angioplasty before 65F 55M? Not Asked   Social History Narrative     Not on file     Social Determinants of Health     Financial Resource Strain: Not on file   Food Insecurity: Not on file   Transportation Needs: Not on file   Physical Activity: Not on file   Stress: Not on file   Social Connections: Not on file   Intimate Partner Violence: Not on file   Housing Stability: Not on file     Family History   Problem Relation Age of Onset     Migraines Mother      Hypothyroidism Mother      Depression Father      No Known Problems Sister      GI problems Brother      No Known Problems Maternal Grandmother      No Known Problems Maternal Grandfather      No Known Problems Paternal Grandmother      No Known Problems Paternal Grandfather      No Known Problems Other      Breast Cancer No family hx of      Ovarian Cancer No family hx of      Colon Cancer No family hx of      Uterine Cancer No family hx of          Health maintenance updated:  yes    ROS:   12 point review of systems negative other than symptoms noted below or in the HPI.    EXAM:  /64   Pulse 62   Ht 1.651 m (5' 5\")   Wt 77.1 kg (170 lb)   BMI 28.29 kg/m    Body mass index is 28.29 kg/m .    ASSESSMENT/PLAN:    ICD-10-CM    1. Encounter for removal of intrauterine contraceptive device  Z30.432    2. Counseling for birth control, emergency contraceptive prescription  Z30.012    3. Encounter for initial prescription of " other contraceptives  Z30.018      There are no contraindications to the use of Phexxi    COUNSELING:  Reviewed risks and benefits of ParaGard, Depo Provera, OCP, Phexxi, and condoms as contraceptive use. She is unable to have estrogen due a potential hx of migraines with auras. She is not interested in the progesterone only IUD again nor does she want Depo. She is interested in non-hormonal bc. She may want a ParaGard later, but is for now is interested in Phexxi.   We discussed Phexxi use and +/- of it.   Discussed usage of Plan B in times when condoms or Phexxi are not used properly.   Discussed proper use of chosen method    10 minutes spent removing the IUD and another 20 minutes were spent on the date of the encounter doing chart review, history and exam, documentation and further activities per the note.    Ruby Wilder, DNP, APRN, CNM

## 2022-01-06 RX ORDER — LACTIC ACID, L-, CITRIC ACID MONOHYDRATE, AND POTASSIUM BITARTRATE 90; 50; 20 MG/5G; MG/5G; MG/5G
1 GEL VAGINAL DAILY PRN
Qty: 300 G | Refills: 4 | Status: SHIPPED | OUTPATIENT
Start: 2022-01-06 | End: 2022-04-01

## 2022-01-06 RX ORDER — LEVONORGESTREL 1.5 MG/1
1.5 TABLET ORAL ONCE
Qty: 1 TABLET | Refills: 1 | Status: SHIPPED | OUTPATIENT
Start: 2022-01-06 | End: 2022-03-28

## 2022-02-03 ENCOUNTER — MYC MEDICAL ADVICE (OUTPATIENT)
Dept: MIDWIFE SERVICES | Facility: CLINIC | Age: 32
End: 2022-02-03
Payer: COMMERCIAL

## 2022-02-03 NOTE — TELEPHONE ENCOUNTER
Reached out to pharmacy - non formulary medication when run through pt insurance.  Would need to start PA process.  Out of pocket cost $350 for 60g  They would also have to order product as not in stock.    Pt informed of above. Also recommended going on phexxi.com website as they have a savings program she could sign up for and check out the actual savings.    She could contact pharmacy, update her insurance info and then inform them she wants PA initiated.    Pt verbalized understanding, in agreement with plan, and voiced no further questions.  Manuela Jenkins RN on 2/3/2022 at 4:28 PM

## 2022-02-24 ENCOUNTER — LAB (OUTPATIENT)
Dept: URGENT CARE | Facility: URGENT CARE | Age: 32
End: 2022-02-24
Attending: FAMILY MEDICINE
Payer: COMMERCIAL

## 2022-02-24 DIAGNOSIS — Z20.822 ENCOUNTER FOR LABORATORY TESTING FOR COVID-19 VIRUS: ICD-10-CM

## 2022-02-24 LAB — SARS-COV-2 RNA RESP QL NAA+PROBE: NEGATIVE

## 2022-02-24 PROCEDURE — U0005 INFEC AGEN DETEC AMPLI PROBE: HCPCS

## 2022-02-24 PROCEDURE — U0003 INFECTIOUS AGENT DETECTION BY NUCLEIC ACID (DNA OR RNA); SEVERE ACUTE RESPIRATORY SYNDROME CORONAVIRUS 2 (SARS-COV-2) (CORONAVIRUS DISEASE [COVID-19]), AMPLIFIED PROBE TECHNIQUE, MAKING USE OF HIGH THROUGHPUT TECHNOLOGIES AS DESCRIBED BY CMS-2020-01-R: HCPCS

## 2022-03-28 ENCOUNTER — MYC MEDICAL ADVICE (OUTPATIENT)
Dept: MIDWIFE SERVICES | Facility: CLINIC | Age: 32
End: 2022-03-28
Payer: COMMERCIAL

## 2022-03-28 ENCOUNTER — TELEPHONE (OUTPATIENT)
Dept: MIDWIFE SERVICES | Facility: CLINIC | Age: 32
End: 2022-03-28
Payer: COMMERCIAL

## 2022-03-28 DIAGNOSIS — Z30.012 EMERGENCY CONTRACEPTION: Primary | ICD-10-CM

## 2022-03-28 DIAGNOSIS — Z30.012 COUNSELING FOR BIRTH CONTROL, EMERGENCY CONTRACEPTIVE PRESCRIPTION: ICD-10-CM

## 2022-03-28 DIAGNOSIS — Z30.018 ENCOUNTER FOR INITIAL PRESCRIPTION OF OTHER CONTRACEPTIVES: ICD-10-CM

## 2022-03-28 RX ORDER — LEVONORGESTREL 1.5 MG/1
1.5 TABLET ORAL ONCE
Qty: 1 TABLET | Refills: 1 | Status: SHIPPED | OUTPATIENT
Start: 2022-03-28 | End: 2023-04-06

## 2022-03-28 RX ORDER — LACTIC ACID, L-, CITRIC ACID MONOHYDRATE, AND POTASSIUM BITARTRATE 90; 50; 20 MG/5G; MG/5G; MG/5G
GEL VAGINAL
Status: CANCELLED | OUTPATIENT
Start: 2022-03-28

## 2022-03-28 NOTE — TELEPHONE ENCOUNTER
levonorgestrel (PLAN B) 1.5 MG tablet 1 tablet 1 1/6/2022 1/6/2022 --   Sig - Route: Take 1 tablet (1.5 mg) by mouth once for 1 dose - Oral   Sent to pharmacy as: Levonorgestrel 1.5 MG Oral Tablet (PLAN B)   Class: E-Prescribe   Order: [id number]   E-Prescribing Status: Receipt confirmed by pharmacy (1/6/2022  1:44 PM CST)         Routing refill request to provider for review/approval because:  Drug not on the [facility] refill protocol       *PA encounter sent to PA pool.  Isabella Garvey RN

## 2022-04-01 RX ORDER — LACTIC ACID, L-, CITRIC ACID MONOHYDRATE, AND POTASSIUM BITARTRATE 90; 50; 20 MG/5G; MG/5G; MG/5G
1 GEL VAGINAL DAILY PRN
Qty: 300 G | Refills: 4 | Status: SHIPPED | OUTPATIENT
Start: 2022-04-01 | End: 2023-04-06

## 2022-08-01 ENCOUNTER — PATIENT OUTREACH (OUTPATIENT)
Dept: MIDWIFE SERVICES | Facility: CLINIC | Age: 32
End: 2022-08-01

## 2022-08-01 DIAGNOSIS — R87.612 PAPANICOLAOU SMEAR OF CERVIX WITH LOW GRADE SQUAMOUS INTRAEPITHELIAL LESION (LGSIL): ICD-10-CM

## 2022-08-31 NOTE — TELEPHONE ENCOUNTER
Patient due for Pap and HPV.    Reminder done today via telephone call. Spoke to pt. She will schedule

## 2022-09-28 NOTE — TELEPHONE ENCOUNTER
FYI to provider - Patient is lost to pap tracking follow-up. Attempts to contact pt have been made per reminder process and there has been no reply and/or no appt scheduled. Contact hx listed below.     8/7/20 LSIL pap @ 31 yo. Plan: colposcopy, due 11/7/20 9/22/20 colp bx EDNA I, ECC neg. Plan: cotest in 1 year  8/18/2021 LSIL pap, +HR HPV (not 16/18) @ 30 yo. Plan: cotest in 1 year  8/1/22 Reminder mychart  8/31/22 Reminder call. Spoke to pt. She will schedule.   09/28/22 Lost to follow-up for pap tracking

## 2022-10-15 ENCOUNTER — HEALTH MAINTENANCE LETTER (OUTPATIENT)
Age: 32
End: 2022-10-15

## 2023-04-06 ENCOUNTER — MYC MEDICAL ADVICE (OUTPATIENT)
Dept: MIDWIFE SERVICES | Facility: CLINIC | Age: 33
End: 2023-04-06
Payer: COMMERCIAL

## 2023-04-06 DIAGNOSIS — Z30.012 ENCOUNTER FOR EMERGENCY CONTRACEPTION: Primary | ICD-10-CM

## 2023-04-06 DIAGNOSIS — Z30.018 ENCOUNTER FOR INITIAL PRESCRIPTION OF OTHER CONTRACEPTIVES: ICD-10-CM

## 2023-04-06 PROBLEM — Z97.5 IUD (INTRAUTERINE DEVICE) IN PLACE: Status: RESOLVED | Noted: 2020-08-07 | Resolved: 2023-04-06

## 2023-04-06 RX ORDER — LACTIC ACID, L-, CITRIC ACID MONOHYDRATE, AND POTASSIUM BITARTRATE 90; 50; 20 MG/5G; MG/5G; MG/5G
1 GEL VAGINAL DAILY PRN
Qty: 300 G | Refills: 4 | Status: SHIPPED | OUTPATIENT
Start: 2023-04-06

## 2023-04-06 RX ORDER — LEVONORGESTREL 1.5 MG/1
1.5 TABLET ORAL ONCE
Qty: 1 TABLET | Refills: 0 | Status: SHIPPED | OUTPATIENT
Start: 2023-04-06 | End: 2023-04-06

## 2023-04-21 NOTE — TELEPHONE ENCOUNTER
Central Prior Authorization Team   Phone: [phone]      PA Initiation    Medication: Phexxi   Insurance Company: [employer] - Phone [phone] Fax [phone]  Pharmacy Filling the Rx: [id number] - [place], [place] - [address]  Filling Pharmacy Phone: [phone]  Filling Pharmacy Fax: [phone]  Start Date: 3/30/2022        
PA approved rx sent  Krystyna Menon RN on 4/1/2022 at 3:52 PM    
Per OV on 1/5/22: Mirena IUD removed  COUNSELING:Reviewed risks and benefits of ParaGard, Depo Provera, OCP, Phexxi, and condoms as contraceptive use. She is unable to have estrogen due a potential hx of migraines with auras. She is not interested in the progesterone only IUD again nor does she want Depo. She is interested in non-hormonal bc. She may want a ParaGard later, but is for now is interested in Phexxi.   We discussed Phexxi use and +/- of it.   Discussed usage of Plan B in times when condoms or Phexxi are not used properly.   Per 2/3/22 [employer] message:  Reached out to pharmacy - non formulary medication when run through pt insurance.  Would need to start PA process.  Out of pocket cost $350 for 60g  They would also have to order product as not in stock.   Pt informed of above. Also recommended going on phexxi.com website as they have a savings program she could sign up for and check out the actual savings.   She could contact pharmacy, update her insurance info and then inform them she wants PA initiated.   Pt verbalized understanding, in agreement with plan, and voiced no further questions.  Manuela Jenkins RN on 2/3/2022 at 4:28 PM            Please initiate PA for Phexxi 1.8-1-0.4% gel     Isabella Garvey, ALYX    
Prior Authorization Approval    Authorization Effective Date: 2/28/2022  Authorization Expiration Date: 3/30/2025  Medication: Phexxi-APPROVED  Approved Dose/Quantity:    Reference #:     Insurance Company: [employer] - Phone [phone] Fax [phone]  Expected CoPay:       CoPay Card Available:      Foundation Assistance Needed:    Which Pharmacy is filling the prescription (Not needed for infusion/clinic administered): [id number] - [place], [place] - [address]  Pharmacy Notified: Yes  Patient Notified: Yes  **Instructed pharmacy to notify patient when script is ready to /ship.**        
Routing to MW's to sign RX.  
Detail Level: Simple

## 2023-05-09 NOTE — PROGRESS NOTES
Olive is a 32 year old  female who presents for annual exam.     Besides routine health maintenance, she has no other health concerns today .  HPI:  No concerns today. Uses Plexxi for birth control and likes it. Plans to continue to use it.   Menses are regular q 28-30 days and normal.  Patient's last menstrual period was 2023..   Using oral contraceptives for contraception.    She is not currently considering pregnancy.    REPRODUCTIVE/GYNECOLOGIC HISTORY:  Olive is sexually active with male partner(s) and is currently in monogamous relationship.   STI testing offered?  Declined  History of abnormal Pap smear:  No  SOCIAL HISTORY  Abuse: does not report having previously been physical or sexually abused.    Do you feel safe in your environment? YES      She  reports that she has never smoked. She has never used smokeless tobacco.      Last PHQ-9 score on record =       2023     3:33 PM   PHQ-9 SCORE   PHQ-9 Total Score 0     Last GAD7 score on record =       2023     3:33 PM   VELVET-7 SCORE   Total Score 0     Alcohol Score =1    HEALTH MAINTENANCE:  Cholesterol: No results for input(s)  Pap: 2022  Mammo: Due at age 42  Colonoscopy: Due at age 47  Dexa: Due at age 67    Overdue          Never   Done ADVANCE CARE PLANNING (Every 5 Years)     2003 DTAP/TDAP/TD IMMUNIZATION (6 - Tdap)  Last completed:  COVID-19 Vaccine (3 - Booster for Moderna series)   Last completed: Sep 10, 2021     AUG 18   2022 YEARLY PREVENTIVE VISIT (Yearly)  Last completed: Aug 18, 2021     AUG 18   2022 PAP FOLLOW-UP (Once)   Order placed this encounter     AUG 18   2022 HPV FOLLOW-UP (Once)   Order placed this encounter     Never   Done INFLUENZA VACCINE (1)     2023 PHQ-2 (once per calendar year) (Yearly, January to December)  Last completed: Aug 18, 2021         HISTORY:  OB History    Para Term  AB Living   0 0 0 0 0 0   SAB IAB Ectopic Multiple Live  "Births   0 0 0 0 0     Past Medical History:   Diagnosis Date     Depression     history of depression 6 yrs ago     Papanicolaou smear of cervix with low grade squamous intraepithelial lesion (LGSIL) 8/7/2020     Past Surgical History:   Procedure Laterality Date     KIDNEY SURGERY  1994    kidney reflux      ZZC RAD RESEC TONSIL/PILLARS  2015     Family History   Problem Relation Age of Onset     Migraines Mother      Hypothyroidism Mother      Depression Father      No Known Problems Sister      GI problems Brother      No Known Problems Maternal Grandmother      No Known Problems Maternal Grandfather      No Known Problems Paternal Grandmother      No Known Problems Paternal Grandfather      No Known Problems Other      Breast Cancer No family hx of      Ovarian Cancer No family hx of      Colon Cancer No family hx of      Uterine Cancer No family hx of      Social History     Socioeconomic History     Marital status: Single   Tobacco Use     Smoking status: Never     Smokeless tobacco: Never   Substance and Sexual Activity     Alcohol use: Yes     Comment: social drinker, very rare     Drug use: Never     Sexual activity: Yes     Partners: Male     Birth control/protection: I.U.D.       Current Outpatient Medications:      Lactic Ac-Citric Ac-Pot Bitart (PHEXXI) 1.8-1-0.4 % GEL, Place 1 Application. vaginally daily as needed (less than 1 hour before intercourse), Disp: 300 g, Rfl: 4     levonorgestrel (PLAN B) 1.5 MG tablet, Take 1 tablet (1.5 mg) by mouth once for 1 dose, Disp: 1 tablet, Rfl: 0   No Known Allergies    Past medical, surgical, social and family history were reviewed and updated in EPIC.    ROS:   12 point review of systems negative other than symptoms noted below or in the HPI.    PHYSICAL EXAM:  /66   Pulse 68   Ht 1.651 m (5' 5\")   Wt 83 kg (183 lb)   LMP 05/04/2023   BMI 30.45 kg/m     BMI: Body mass index is 30.45 kg/m .  Constitutional: healthy, alert and no distress  Neck: " symmetrical, thyroid normal size, no masses present, no lymphadenopathy present.   Cardiovascular: RRR, no murmurs present  Respiratory: breathing unlabored, lungs CTA bilaterally  Breast:normal without masses, tenderness or nipple discharge and no palpable axillary masses or adenopathy  Gastrointestinal: abdomen soft, non-tender, bowel sounds present  PELVIC EXAM:  Vulva: No lesions, no adenopathy, BUS WNL  Vagina: Moist, pink, discharge normal  well rugated, no lesions  Cervix:smooth, pink, no visible lesions, pap collected  Uterus: Normal size, non-tender, mobile  Ovaries: No masses palpated  Rectal exam: deferred    ASSESSMENT/PLAN:    ICD-10-CM    1. Encounter for gynecological examination without abnormal finding  Z01.419 Pap thin layer screen with HPV - recommended age 30 - 65 years      2. Screening for human papillomavirus  Z11.51 Pap thin layer screen with HPV - recommended age 30 - 65 years        No results found for any visits on 05/12/23.      COUNSELING:   Reviewed preventive health counseling, as reflected in patient instructions   reports that she has never smoked. She has never used smokeless tobacco.     Reviewed Pap screening guidelines.   Will rx Plexxi as needed.     Ruby Wilder, DNP, APRN, CNM

## 2023-05-12 ENCOUNTER — OFFICE VISIT (OUTPATIENT)
Dept: MIDWIFE SERVICES | Facility: CLINIC | Age: 33
End: 2023-05-12
Payer: COMMERCIAL

## 2023-05-12 VITALS
HEART RATE: 68 BPM | WEIGHT: 183 LBS | BODY MASS INDEX: 30.49 KG/M2 | DIASTOLIC BLOOD PRESSURE: 66 MMHG | SYSTOLIC BLOOD PRESSURE: 110 MMHG | HEIGHT: 65 IN

## 2023-05-12 DIAGNOSIS — Z11.51 SCREENING FOR HUMAN PAPILLOMAVIRUS: ICD-10-CM

## 2023-05-12 DIAGNOSIS — Z01.419 ENCOUNTER FOR GYNECOLOGICAL EXAMINATION WITHOUT ABNORMAL FINDING: Primary | ICD-10-CM

## 2023-05-12 PROCEDURE — 99395 PREV VISIT EST AGE 18-39: CPT | Performed by: ADVANCED PRACTICE MIDWIFE

## 2023-05-12 PROCEDURE — G0145 SCR C/V CYTO,THINLAYER,RESCR: HCPCS | Performed by: ADVANCED PRACTICE MIDWIFE

## 2023-05-12 PROCEDURE — 87624 HPV HI-RISK TYP POOLED RSLT: CPT | Performed by: ADVANCED PRACTICE MIDWIFE

## 2023-05-12 PROCEDURE — G0124 SCREEN C/V THIN LAYER BY MD: HCPCS | Performed by: PATHOLOGY

## 2023-05-12 ASSESSMENT — ANXIETY QUESTIONNAIRES
1. FEELING NERVOUS, ANXIOUS, OR ON EDGE: NOT AT ALL
GAD7 TOTAL SCORE: 0
3. WORRYING TOO MUCH ABOUT DIFFERENT THINGS: NOT AT ALL
7. FEELING AFRAID AS IF SOMETHING AWFUL MIGHT HAPPEN: NOT AT ALL
6. BECOMING EASILY ANNOYED OR IRRITABLE: NOT AT ALL
2. NOT BEING ABLE TO STOP OR CONTROL WORRYING: NOT AT ALL
5. BEING SO RESTLESS THAT IT IS HARD TO SIT STILL: NOT AT ALL
GAD7 TOTAL SCORE: 0

## 2023-05-12 ASSESSMENT — PATIENT HEALTH QUESTIONNAIRE - PHQ9
5. POOR APPETITE OR OVEREATING: NOT AT ALL
SUM OF ALL RESPONSES TO PHQ QUESTIONS 1-9: 0

## 2023-05-19 LAB
BKR LAB AP GYN ADEQUACY: ABNORMAL
BKR LAB AP GYN INTERPRETATION: ABNORMAL
BKR LAB AP HPV REFLEX: ABNORMAL
BKR LAB AP PREVIOUS ABNORMAL: ABNORMAL
PATH REPORT.COMMENTS IMP SPEC: ABNORMAL
PATH REPORT.COMMENTS IMP SPEC: ABNORMAL
PATH REPORT.RELEVANT HX SPEC: ABNORMAL

## 2023-05-22 LAB
HUMAN PAPILLOMA VIRUS 16 DNA: NEGATIVE
HUMAN PAPILLOMA VIRUS 18 DNA: NEGATIVE
HUMAN PAPILLOMA VIRUS FINAL DIAGNOSIS: NORMAL
HUMAN PAPILLOMA VIRUS OTHER HR: NEGATIVE

## 2023-05-23 ENCOUNTER — PATIENT OUTREACH (OUTPATIENT)
Dept: OBGYN | Facility: CLINIC | Age: 33
End: 2023-05-23
Payer: COMMERCIAL

## 2023-05-23 DIAGNOSIS — R87.612 PAPANICOLAOU SMEAR OF CERVIX WITH LOW GRADE SQUAMOUS INTRAEPITHELIAL LESION (LGSIL): ICD-10-CM

## 2024-04-29 ENCOUNTER — PATIENT OUTREACH (OUTPATIENT)
Dept: OBGYN | Facility: CLINIC | Age: 34
End: 2024-04-29
Payer: COMMERCIAL

## 2024-04-29 DIAGNOSIS — R87.612 PAPANICOLAOU SMEAR OF CERVIX WITH LOW GRADE SQUAMOUS INTRAEPITHELIAL LESION (LGSIL): Primary | ICD-10-CM

## 2024-06-24 NOTE — TELEPHONE ENCOUNTER
FYI to provider - Patient is lost to pap tracking follow-up. Attempts to contact pt have been made per reminder process and there has been no reply and/or no appt scheduled. Contact hx listed below.     8/7/20 LSIL pap @ 29 yo. Plan: colposcopy, due 11/7/20 9/22/20 [facility] bx EDNA I, ECC neg. Plan: cotest in 1 year  8/18/2021 LSIL pap, +HR HPV (not 16/18) @ 30 yo. Plan: cotest in 1 year  09/28/22 Lost to follow-up for pap tracking   5/12/23 ASCUS pap, neg HR HPV. Plan 1 year cotest / MyChart sent - viewed  04/29/24 Reminder MyChart   06/04/24 Reminder call - pt notified  06/24/24 Lost to follow-up for pap tracking     ZAC uHN, RN

## 2024-08-04 ENCOUNTER — HEALTH MAINTENANCE LETTER (OUTPATIENT)
Age: 34
End: 2024-08-04

## 2025-03-27 ENCOUNTER — OFFICE VISIT (OUTPATIENT)
Dept: MIDWIFE SERVICES | Facility: CLINIC | Age: 35
End: 2025-03-27
Payer: COMMERCIAL

## 2025-03-27 VITALS
BODY MASS INDEX: 25.33 KG/M2 | DIASTOLIC BLOOD PRESSURE: 62 MMHG | SYSTOLIC BLOOD PRESSURE: 112 MMHG | WEIGHT: 157.6 LBS | HEIGHT: 66 IN

## 2025-03-27 DIAGNOSIS — Z30.49 ENCOUNTER FOR SURVEILLANCE OF OTHER CONTRACEPTIVE: ICD-10-CM

## 2025-03-27 DIAGNOSIS — Z12.4 PAP SMEAR FOR CERVICAL CANCER SCREENING: ICD-10-CM

## 2025-03-27 DIAGNOSIS — Z01.419 WOMEN'S ANNUAL ROUTINE GYNECOLOGICAL EXAMINATION: Primary | ICD-10-CM

## 2025-03-27 RX ORDER — LACTIC ACID, L-, CITRIC ACID MONOHYDRATE, AND POTASSIUM BITARTRATE 90; 50; 20 MG/5G; MG/5G; MG/5G
1 GEL VAGINAL DAILY PRN
Qty: 300 G | Refills: 4 | Status: SHIPPED | OUTPATIENT
Start: 2025-03-27

## 2025-03-27 RX ORDER — NAPROXEN 500 MG/1
500 TABLET ORAL
COMMUNITY
Start: 2024-02-13

## 2025-03-27 RX ORDER — DIGITAL THERAPEUTICS,FERTILITY
12 MISCELLANEOUS MISCELLANEOUS SEE ADMIN INSTRUCTIONS
Status: SHIPPED
Start: 2025-03-27

## 2025-03-27 NOTE — PROGRESS NOTES
Olive is a 34 year old  female who presents for annual exam.     Besides routine health maintenance, she has no other health concerns today .    HPI:  [place]. She is finishing her flowers and other details.     They have not been sexually active in a long time and plan to wait until marriage. At that time they will discuss the timeline for children.     She wants a refill on Phexxi. Additionally she wants to discuss Natural Cycles      GYNECOLOGIC HISTORY:    Patient's last menstrual period was 2025.    Regular menses? yes  Menses every 28-30 days.  Length of menses: 6 days    Her current contraception method is: none.  She  reports that she has never smoked. She has never used smokeless tobacco.    Patient is not sexually active.  STD testing offered?  Declined  Last PHQ-9 score on record =       2023     3:33 PM   PHQ-9 SCORE   PHQ-9 Total Score 0     Last GAD7 score on record =       2023     3:33 PM   VELVET-7 SCORE   Total Score 0       HEALTH MAINTENANCE:    Pap:   Lab Results   Component Value Date    GYNINTERP  2023     Atypical squamous cells of undetermined significance (ASC-US)    GYNINTERP  2021     Low-grade squamous intraepithelial lesion (LSIL) encompassing HPV/mild dysplasia/CIN1    PAP LSIL 2020          Care Gaps    Overdue          Never done ADVANCE CARE PLANNING (Every 5 Years)     2003 DTAP/TDAP/TD IMMUNIZATION (6 - Tdap)  Last completed: 2003     MAY 12  2024 YEARLY PREVENTIVE VISIT (Yearly)  Last completed: May 12, 2023     MAY 12  2024 PAP FOLLOW-UP (Once)  Last completed: May 12, 2023     MAY 12  2024 HPV FOLLOW-UP (Once)  Last completed: May 12, 2023     Never done INFLUENZA VACCINE (1)     SEP 1  2024 COVID-19 Vaccine (3 - 2024-25 season)   Last completed: Sep 10, 2021         Upcoming          2042 ZOSTER IMMUNIZATION (1 of 2)       HISTORY:  OB History    Para Term  AB  "Living   0 0 0 0 0 0   SAB IAB Ectopic Multiple Live Births   0 0 0 0 0       Patient Active Problem List   Diagnosis    Depression    Papanicolaou smear of cervix with low grade squamous intraepithelial lesion (LGSIL)     Past Surgical History:   Procedure Laterality Date    KIDNEY SURGERY  1994    kidney reflux     ZZC RAD RESEC TONSIL/PILLARS  2015      Social History     Tobacco Use    Smoking status: Never    Smokeless tobacco: Never   Substance Use Topics    Alcohol use: Yes     Comment: social drinker, very rare      Problem (# of Occurrences) Relation (Name,Age of Onset)    Depression (1) Father    GI problems (1) Brother    Migraines (1) Mother    Hypothyroidism (1) Mother    No Known Problems (6) Sister, Maternal Grandmother, Maternal Grandfather, Paternal Grandmother, Paternal Grandfather, Other           Negative family history of: Breast Cancer, Ovarian Cancer, Colon Cancer, Uterine Cancer              Current Outpatient Medications   Medication Sig Dispense Refill    DTx [facility] (NATURAL CYCLES CONTRACEPTIVE) MISC 12 Units See Admin Instructions. One annual subscription      Lactic Ac-Citric Ac-Pot Bitart (PHEXXI) 1.8-1-0.4 % GEL Place 1 Application. vaginally daily as needed (less than 1 hour before intercourse). 300 g 4    naproxen (NAPROSYN) 500 MG tablet Take 500 mg by mouth.      levonorgestrel (PLAN B) 1.5 MG tablet Take 1 tablet (1.5 mg) by mouth once for 1 dose 1 tablet 0     No current facility-administered medications for this visit.     No Known Allergies    Past medical, surgical, social and family histories were reviewed and updated in EPIC.    ROS:   12 point review of systems negative other than symptoms noted below or in the HPI.  No urinary frequency or dysuria, bladder or kidney problems    EXAM:  /62   Ht 1.679 m (5' 6.1\")   Wt 71.5 kg (157 lb 9.6 oz)   LMP 03/05/2025   BMI 25.36 kg/m     BMI: Body mass index is 25.36 kg/m .    PHYSICAL EXAM:  Constitutional: "   Appearance: Well nourished, well developed, alert, in no acute distress  Neck:  Lymph Nodes:  No lymphadenopathy present    Thyroid:  Gland size normal, nontender, no nodules or masses present  on palpation  Chest:  Respiratory Effort:  Breathing unlabored  Cardiovascular:    Heart: Auscultation:  Regular rate, normal rhythm, no murmurs present  Breasts: Inspection of Breasts:  No lymphadenopathy present., Palpation of Breasts and Axillae:  No masses present on palpation, no breast tenderness., Axillary Lymph Nodes:  No lymphadenopathy present., and No nodularity, asymmetry or nipple discharge bilaterally.  Gastrointestinal:   Abdominal Examination:  Abdomen nontender to palpation, tone normal without rigidity or guarding, no masses present, umbilicus without lesions   Liver and Spleen:  No hepatomegaly present, liver nontender to palpation    Hernias:  No hernias present  Lymphatic: Lymph Nodes:  No other lymphadenopathy present  Skin:  General Inspection:  No rashes present, no lesions present, no areas of  discoloration  Neurologic:    Mental Status:  Oriented X3.  Normal strength and tone, sensory exam                grossly normal, mentation intact and speech normal.    Psychiatric:   Mentation appears normal and affect normal/bright.         Pelvic Exam:  External Genitalia:     Normal appearance for age, no discharge present, no tenderness present, no inflammatory lesions present, color normal  Vagina:     Normal vaginal vault without central or paravaginal defects, no discharge present, no inflammatory lesions present, no masses present  Bladder:     Nontender to palpation  Urethra:   Urethral Body:  Urethra palpation normal, urethra structural support normal   Urethral Meatus:  No erythema or lesions present  Cervix:     Appearance healthy, no lesions present, nontender to palpation, no bleeding present, pap collected  Uterus:     Uterus: firm, normal sized and nontender, anteverted in position.   Adnexa:      No adnexal tenderness present, no adnexal masses present  Perineum:     Perineum within normal limits, no evidence of trauma, no rashes or skin lesions present  Anus:     Anus within normal limits, no hemorrhoids present  Inguinal Lymph Nodes:     No lymphadenopathy present  Pubic Hair:     Normal pubic hair distribution for age  Genitalia and Groin:     No rashes present, no lesions present, no areas of discoloration, no masses present    COUNSELING:   Reviewed preventive health counseling, as reflected in patient instructions       Contraception       Family planning    BMI: Body mass index is 25.36 kg/m .      ASSESSMENT:  34 year old female with satisfactory annual exam.    ICD-10-CM    1. Women's annual routine gynecological examination  Z01.419       2. Pap smear for cervical cancer screening  Z12.4 HPV and Gynecologic Cytology Panel - Recommended Age 30 - 65 Years      3. Encounter for surveillance of other contraceptive  Z30.49 Lactic Ac-Citric Ac-Pot Bitart (PHEXXI) 1.8-1-0.4 % GEL     DTx [facility] (NATURAL CYCLES CONTRACEPTIVE) MISC          PLAN:  Reviewed [employer]. Discussed rx for the mary. Additionally Phexxi ordered so that it can be used during high fertility times.     Pap completed.     Plan for annual visit in 1 year, sooner if needed.     ANNEMARIE Tejeda CNM

## 2025-03-29 LAB
HPV HR 12 DNA CVX QL NAA+PROBE: NEGATIVE
HPV16 DNA CVX QL NAA+PROBE: NEGATIVE
HPV18 DNA CVX QL NAA+PROBE: NEGATIVE
HUMAN PAPILLOMA VIRUS FINAL DIAGNOSIS: NORMAL

## 2025-04-07 ENCOUNTER — PATIENT OUTREACH (OUTPATIENT)
Dept: OBGYN | Facility: CLINIC | Age: 35
End: 2025-04-07
Payer: COMMERCIAL